# Patient Record
Sex: FEMALE | Race: ASIAN | NOT HISPANIC OR LATINO | URBAN - METROPOLITAN AREA
[De-identification: names, ages, dates, MRNs, and addresses within clinical notes are randomized per-mention and may not be internally consistent; named-entity substitution may affect disease eponyms.]

---

## 2024-01-20 ENCOUNTER — EMERGENCY (EMERGENCY)
Facility: HOSPITAL | Age: 39
LOS: 1 days | Discharge: HOME | End: 2024-01-20
Attending: EMERGENCY MEDICINE
Payer: MEDICAID

## 2024-01-20 VITALS
OXYGEN SATURATION: 98 % | DIASTOLIC BLOOD PRESSURE: 82 MMHG | WEIGHT: 95.24 LBS | HEART RATE: 82 BPM | RESPIRATION RATE: 16 BRPM | TEMPERATURE: 99 F | SYSTOLIC BLOOD PRESSURE: 121 MMHG

## 2024-01-20 PROCEDURE — 73080 X-RAY EXAM OF ELBOW: CPT

## 2024-01-20 PROCEDURE — 73130 X-RAY EXAM OF HAND: CPT | Mod: 26,LT

## 2024-01-20 PROCEDURE — 73110 X-RAY EXAM OF WRIST: CPT | Mod: 26,LT

## 2024-01-20 PROCEDURE — 29125 APPL SHORT ARM SPLINT STATIC: CPT | Mod: LT

## 2024-01-20 PROCEDURE — 99284 EMERGENCY DEPT VISIT MOD MDM: CPT | Mod: 57

## 2024-01-20 PROCEDURE — 73110 X-RAY EXAM OF WRIST: CPT

## 2024-01-20 PROCEDURE — 99284 EMERGENCY DEPT VISIT MOD MDM: CPT | Mod: 25

## 2024-01-20 PROCEDURE — 73130 X-RAY EXAM OF HAND: CPT

## 2024-01-20 PROCEDURE — 73060 X-RAY EXAM OF HUMERUS: CPT

## 2024-01-20 PROCEDURE — 73090 X-RAY EXAM OF FOREARM: CPT

## 2024-01-20 PROCEDURE — 73090 X-RAY EXAM OF FOREARM: CPT | Mod: 26,LT

## 2024-01-20 PROCEDURE — 73080 X-RAY EXAM OF ELBOW: CPT | Mod: 26,LT

## 2024-01-20 PROCEDURE — 73060 X-RAY EXAM OF HUMERUS: CPT | Mod: 26,LT

## 2024-01-20 PROCEDURE — 25600 CLTX DST RDL FX/EPHYS SEP WO: CPT | Mod: 54,LT

## 2024-01-20 RX ORDER — ACETAMINOPHEN 500 MG
975 TABLET ORAL ONCE
Refills: 0 | Status: COMPLETED | OUTPATIENT
Start: 2024-01-20 | End: 2024-01-20

## 2024-01-20 RX ORDER — OXYCODONE HYDROCHLORIDE 5 MG/1
5 TABLET ORAL ONCE
Refills: 0 | Status: DISCONTINUED | OUTPATIENT
Start: 2024-01-20 | End: 2024-01-20

## 2024-01-20 RX ORDER — ACETAMINOPHEN 500 MG
2 TABLET ORAL
Qty: 84 | Refills: 0
Start: 2024-01-20 | End: 2024-02-02

## 2024-01-20 RX ORDER — IBUPROFEN 200 MG
400 TABLET ORAL ONCE
Refills: 0 | Status: COMPLETED | OUTPATIENT
Start: 2024-01-20 | End: 2024-01-20

## 2024-01-20 RX ORDER — OXYCODONE HYDROCHLORIDE 5 MG/1
1 TABLET ORAL
Qty: 12 | Refills: 0
Start: 2024-01-20 | End: 2024-01-23

## 2024-01-20 RX ORDER — IBUPROFEN 200 MG
1 TABLET ORAL
Qty: 56 | Refills: 0
Start: 2024-01-20 | End: 2024-02-02

## 2024-01-20 RX ADMIN — OXYCODONE HYDROCHLORIDE 5 MILLIGRAM(S): 5 TABLET ORAL at 21:39

## 2024-01-20 RX ADMIN — Medication 400 MILLIGRAM(S): at 21:39

## 2024-01-20 RX ADMIN — OXYCODONE HYDROCHLORIDE 5 MILLIGRAM(S): 5 TABLET ORAL at 22:09

## 2024-01-20 RX ADMIN — Medication 975 MILLIGRAM(S): at 22:09

## 2024-01-20 RX ADMIN — Medication 975 MILLIGRAM(S): at 21:39

## 2024-01-20 RX ADMIN — Medication 400 MILLIGRAM(S): at 22:09

## 2024-01-20 NOTE — ED PROVIDER NOTE - ATTENDING CONTRIBUTION TO CARE
I performed the initial face to face bedside interview with this patient regarding history of present illness, review of symptoms and past medical, social and family history.  I completed an independent physical examination.  I was the initial provider who evaluated this patient.  The history, review of symptoms and examination was documented by the resident in my presence and I attest to the accuracy of the documentation.   I have discussed the patient’s plan of care and disposition with the resident.    +fx on xray. splinted. will dc with ortho f/u.

## 2024-01-20 NOTE — ED PROCEDURE NOTE - ATTENDING CONTRIBUTION TO CARE
I performed the initial face to face bedside interview with this patient regarding history of present illness, review of symptoms and past medical, social and family history.  I completed an independent physical examination.  I was the initial provider who evaluated this patient.  The history, review of symptoms and examination was documented by the resident in my presence and I attest to the accuracy of the documentation.   I have discussed the patient’s plan of care and disposition with the resident.

## 2024-01-20 NOTE — ED PROVIDER NOTE - CLINICAL SUMMARY MEDICAL DECISION MAKING FREE TEXT BOX
38-year-old female, no medical history, presenting with 1 day onset of left wrist pain, left forearm pain, left elbow pain, after a mechanical fall.  Patient is primarily Uzbek speaking.  Interpretation service offered.  Patient declined, preferred to have her friend Mr. Colt Garcia translates Uzbek language.  Patient reports that she slipped and fell at home and fell on the left side.  Denies head injury, LOC, any other injury.  Reports pain is 7 out of 10.  Did not take any medicine before coming in. Vital signs within normal limits on arrival.  Physical exam as noted above.  Notable for L wrist is swollen, L radial pulse intact, neurovascular intact, limited ROM of L wrist, L finger, L elbow due to pain, ROM of L shoulder normal.  Will get x-ray of her left humerus, elbow, forearm, wrist, hand to evaluate for fracture.  Will manage pain, reassess.

## 2024-01-20 NOTE — ED PROVIDER NOTE - OBJECTIVE STATEMENT
38-year-old female, no medical history, presenting with 1 day onset of left wrist pain, left forearm pain, left elbow pain, after a mechanical fall.  Patient is primarily Divehi speaking.  Interpretation service offered.  Patient declined, preferred to have her friend Mr. Colt Garcia translates Divehi language.  Patient reports that she slipped and fell at home and fell on the left side.  Denies head injury, LOC, any other injury.  Reports pain is 7 out of 10.  Did not take any medicine before coming in.

## 2024-01-20 NOTE — ED PROVIDER NOTE - PATIENT PORTAL LINK FT
You can access the FollowMyHealth Patient Portal offered by [facility] by registering at the following website: [email or web address]. By joining [facility]’s FollowMyHealth portal, you will also be able to view your health information using other applications (apps) compatible with our system.

## 2024-01-20 NOTE — ED ADULT NURSE NOTE - NSFALLRISKINTERV_ED_ALL_ED

## 2024-01-20 NOTE — ED PROVIDER NOTE - PHYSICAL EXAMINATION
Gen: Patient is NAD, AAOx3, able to ambulate without assistance  HEENT: NCAT, EOMI, PERRLA, normal conjunctiva, tongue midline, oral mucosa moist  Lung: CTAB, no respiratory distress, no wheezes/rhonchi/rales B/L, speaking in full sentences  CV: RRR, no murmurs, rubs or gallops, distal pulses 2+ b/l  Abd: soft, NT, ND, no guarding, no rigidity, no rebound tenderness, no CVA tenderness   MSK: L wrist is swollen, L radial pulse intact, neurovascular intact, limited ROM of L wrist, L finger, L elbow due to pain, ROM of L shoulder normal, ROM normal in RUE/RLE/LLE, no back TTP  Neuro: No focal sensory or motor deficits  Skin: Warm, well perfused, no rash, no leg swelling  Psych: normal affect, calm

## 2024-01-20 NOTE — ED PROVIDER NOTE - NSFOLLOWUPINSTRUCTIONS_ED_ALL_ED_FT
You came to the emergency room because of left wrist pain after fall.      Your x-ray shows that you have a fracture of your distal radius.      For severe pain,   Please take the prescribed medication oxycodone 5 mg every 8 hours as needed for severe pain.    For mild-moderate pain,  You may take 1000 mg acetaminophen every 8 hours, as needed for pain  You may take 400 mg ibuprofen every 6 hours, with food, as needed for pain    You were splinted at the emergency room.      Please follow-up with the orthopedic surgeon in the clinic within the next 7 days for further evaluation and monitoring.      A representative from [facility] will contact you within the next few days to schedule for appointment.      Please come back to the emergency room if your symptoms get worse.

## 2024-01-20 NOTE — ED PROVIDER NOTE - NSICDXNOPASTSURGICALHX_GEN_ALL_ED
Pt and mom left prior to receiving discharge instructions. Walter Chancema aware.
<-- Click to add NO significant Past Surgical History

## 2024-01-22 PROBLEM — Z00.00 ENCOUNTER FOR PREVENTIVE HEALTH EXAMINATION: Status: ACTIVE | Noted: 2024-01-22

## 2024-01-23 ENCOUNTER — APPOINTMENT (OUTPATIENT)
Dept: ORTHOPEDIC SURGERY | Facility: CLINIC | Age: 39
End: 2024-01-23
Payer: MEDICAID

## 2024-01-23 PROCEDURE — 73110 X-RAY EXAM OF WRIST: CPT | Mod: LT

## 2024-01-23 PROCEDURE — 99204 OFFICE O/P NEW MOD 45 MIN: CPT

## 2024-01-23 RX ORDER — IBUPROFEN 800 MG/1
800 TABLET, FILM COATED ORAL 3 TIMES DAILY
Qty: 90 | Refills: 11 | Status: ACTIVE | COMMUNITY
Start: 2024-01-23 | End: 1900-01-01

## 2024-01-24 ENCOUNTER — OUTPATIENT (OUTPATIENT)
Dept: OUTPATIENT SERVICES | Facility: HOSPITAL | Age: 39
LOS: 1 days | End: 2024-01-24
Payer: MEDICAID

## 2024-01-24 VITALS
RESPIRATION RATE: 14 BRPM | WEIGHT: 110.01 LBS | HEIGHT: 64.5 IN | HEART RATE: 73 BPM | OXYGEN SATURATION: 100 % | TEMPERATURE: 98 F | DIASTOLIC BLOOD PRESSURE: 74 MMHG | SYSTOLIC BLOOD PRESSURE: 114 MMHG

## 2024-01-24 DIAGNOSIS — D21.9 BENIGN NEOPLASM OF CONNECTIVE AND OTHER SOFT TISSUE, UNSPECIFIED: Chronic | ICD-10-CM

## 2024-01-24 DIAGNOSIS — S52.572D OTHER INTRAARTICULAR FRACTURE OF LOWER END OF LEFT RADIUS, SUBSEQUENT ENCOUNTER FOR CLOSED FRACTURE WITH ROUTINE HEALING: ICD-10-CM

## 2024-01-24 DIAGNOSIS — Z01.818 ENCOUNTER FOR OTHER PREPROCEDURAL EXAMINATION: ICD-10-CM

## 2024-01-24 DIAGNOSIS — S52.572A OTHER INTRAARTICULAR FRACTURE OF LOWER END OF LEFT RADIUS, INITIAL ENCOUNTER FOR CLOSED FRACTURE: ICD-10-CM

## 2024-01-24 LAB
HCT VFR BLD CALC: 34.8 % — SIGNIFICANT CHANGE UP (ref 34.5–45)
HGB BLD-MCNC: 11.1 G/DL — LOW (ref 11.5–15.5)
MCHC RBC-ENTMCNC: 28.5 PG — SIGNIFICANT CHANGE UP (ref 27–34)
MCHC RBC-ENTMCNC: 31.9 GM/DL — LOW (ref 32–36)
MCV RBC AUTO: 89.2 FL — SIGNIFICANT CHANGE UP (ref 80–100)
NRBC # BLD: 0 /100 WBCS — SIGNIFICANT CHANGE UP (ref 0–0)
PLATELET # BLD AUTO: 160 K/UL — SIGNIFICANT CHANGE UP (ref 150–400)
RBC # BLD: 3.9 M/UL — SIGNIFICANT CHANGE UP (ref 3.8–5.2)
RBC # FLD: 14 % — SIGNIFICANT CHANGE UP (ref 10.3–14.5)
WBC # BLD: 6.84 K/UL — SIGNIFICANT CHANGE UP (ref 3.8–10.5)
WBC # FLD AUTO: 6.84 K/UL — SIGNIFICANT CHANGE UP (ref 3.8–10.5)

## 2024-01-24 PROCEDURE — 85027 COMPLETE CBC AUTOMATED: CPT

## 2024-01-24 PROCEDURE — G0463: CPT

## 2024-01-24 RX ORDER — SODIUM CHLORIDE 9 MG/ML
3 INJECTION INTRAMUSCULAR; INTRAVENOUS; SUBCUTANEOUS EVERY 8 HOURS
Refills: 0 | Status: DISCONTINUED | OUTPATIENT
Start: 2024-01-26 | End: 2024-02-09

## 2024-01-24 RX ORDER — SODIUM CHLORIDE 9 MG/ML
1000 INJECTION, SOLUTION INTRAVENOUS
Refills: 0 | Status: DISCONTINUED | OUTPATIENT
Start: 2024-01-26 | End: 2024-02-09

## 2024-01-24 NOTE — H&P PST ADULT - PROBLEM SELECTOR PLAN 1
Pt scheduled for open reduction internal fixation, left distal radius with Dr. Keiry Ronquillo 1/26/2024.  -Pre op instructions provided.  LABS: CBC done at [facility].

## 2024-01-24 NOTE — H&P PST ADULT - ASSESSMENT
DASI score: 7.68  DASI activity: Able to walk long distance, incline/stairs, shopping, carry groceries, house chores, active, self care without SOB, CP.  Loose teeth or denture: denies any loose teeth, implants, dentures

## 2024-01-24 NOTE — H&P PST ADULT - HISTORY OF PRESENT ILLNESS
39 y/o F, German speaking, PMHx (denies any medical issues), sustained a fall at home and landed on left forearm, had a lot of pain and went to the ED 1/20/2024.  Left forearm in splint, pain scale 8/10 achy, throbbing.  PT taking Ibuprofen and Oxycodone until yesterday.  PT presents for PST for open reduction internal fixation, left distal radius with Dr. Keiry Ronquillo 1/26/2024.  PT denies any fever, chills, N/V/D, SOB, CP, dizziness, HA, urinary or BM issues.    "***OF note, pt stated she wants to be under anesthesia without waking up in the middle of procedure as it has happened when she had fibroid removed.

## 2024-01-25 ENCOUNTER — TRANSCRIPTION ENCOUNTER (OUTPATIENT)
Age: 39
End: 2024-01-25

## 2024-01-26 ENCOUNTER — APPOINTMENT (OUTPATIENT)
Dept: ORTHOPEDIC SURGERY | Facility: HOSPITAL | Age: 39
End: 2024-01-26

## 2024-01-26 ENCOUNTER — TRANSCRIPTION ENCOUNTER (OUTPATIENT)
Age: 39
End: 2024-01-26

## 2024-01-26 ENCOUNTER — OUTPATIENT (OUTPATIENT)
Dept: OUTPATIENT SERVICES | Facility: HOSPITAL | Age: 39
LOS: 1 days | End: 2024-01-26
Payer: MEDICAID

## 2024-01-26 VITALS
HEART RATE: 70 BPM | SYSTOLIC BLOOD PRESSURE: 93 MMHG | OXYGEN SATURATION: 100 % | HEIGHT: 64.5 IN | DIASTOLIC BLOOD PRESSURE: 58 MMHG | TEMPERATURE: 99 F | RESPIRATION RATE: 16 BRPM | WEIGHT: 110.01 LBS

## 2024-01-26 VITALS
OXYGEN SATURATION: 99 % | DIASTOLIC BLOOD PRESSURE: 69 MMHG | RESPIRATION RATE: 18 BRPM | HEART RATE: 72 BPM | SYSTOLIC BLOOD PRESSURE: 111 MMHG

## 2024-01-26 DIAGNOSIS — S52.572D OTHER INTRAARTICULAR FRACTURE OF LOWER END OF LEFT RADIUS, SUBSEQUENT ENCOUNTER FOR CLOSED FRACTURE WITH ROUTINE HEALING: ICD-10-CM

## 2024-01-26 DIAGNOSIS — D21.9 BENIGN NEOPLASM OF CONNECTIVE AND OTHER SOFT TISSUE, UNSPECIFIED: Chronic | ICD-10-CM

## 2024-01-26 PROCEDURE — 25608 OPTX DST RD XART FX/EPI SEP2: CPT | Mod: LT

## 2024-01-26 PROCEDURE — 25609 OPTX DST RD XART FX/EP SEP3+: CPT | Mod: LT

## 2024-01-26 PROCEDURE — 76000 FLUOROSCOPY <1 HR PHYS/QHP: CPT

## 2024-01-26 PROCEDURE — C1713: CPT

## 2024-01-26 DEVICE — SCREW TRILOCK 2.5X18MM: Type: IMPLANTABLE DEVICE | Site: LEFT | Status: FUNCTIONAL

## 2024-01-26 DEVICE — SCREW CORT 2.5X12MM: Type: IMPLANTABLE DEVICE | Site: LEFT | Status: FUNCTIONAL

## 2024-01-26 DEVICE — PLATE NARROW 10H LEFT: Type: IMPLANTABLE DEVICE | Site: LEFT | Status: FUNCTIONAL

## 2024-01-26 RX ORDER — OXYCODONE 5 MG/1
5 TABLET ORAL
Qty: 10 | Refills: 0 | Status: ACTIVE | COMMUNITY
Start: 2024-01-26 | End: 1900-01-01

## 2024-01-26 RX ORDER — FENTANYL CITRATE 50 UG/ML
25 INJECTION INTRAVENOUS
Refills: 0 | Status: DISCONTINUED | OUTPATIENT
Start: 2024-01-26 | End: 2024-01-26

## 2024-01-26 RX ORDER — ONDANSETRON 8 MG/1
4 TABLET, FILM COATED ORAL ONCE
Refills: 0 | Status: DISCONTINUED | OUTPATIENT
Start: 2024-01-26 | End: 2024-02-09

## 2024-01-26 RX ORDER — LIDOCAINE HCL 20 MG/ML
0.2 VIAL (ML) INJECTION ONCE
Refills: 0 | Status: COMPLETED | OUTPATIENT
Start: 2024-01-26 | End: 2024-01-26

## 2024-01-26 RX ORDER — CEFAZOLIN SODIUM 1 G
2000 VIAL (EA) INJECTION ONCE
Refills: 0 | Status: COMPLETED | OUTPATIENT
Start: 2024-01-26 | End: 2024-01-26

## 2024-01-26 RX ADMIN — SODIUM CHLORIDE 100 MILLILITER(S): 9 INJECTION, SOLUTION INTRAVENOUS at 06:32

## 2024-01-26 RX ADMIN — SODIUM CHLORIDE 3 MILLILITER(S): 9 INJECTION INTRAMUSCULAR; INTRAVENOUS; SUBCUTANEOUS at 06:39

## 2024-01-26 NOTE — ASU PATIENT PROFILE, ADULT - FALL HARM RISK - HARM RISK INTERVENTIONS

## 2024-01-26 NOTE — BRIEF OPERATIVE NOTE - NSICDXBRIEFPROCEDURE_GEN_ALL_CORE_FT
PROCEDURES:  ORIF, 2-part fracture, radius, distal, intra-articular 26-Jan-2024 09:37:27  Denton Buckner

## 2024-01-26 NOTE — ASU DISCHARGE PLAN (ADULT/PEDIATRIC) - NURSING INSTRUCTIONS
OK to take Tylenol/Acetaminophen 1000mg  anytime after discharge for pain and every 6 hours after as needed. OK to take Motrin/Ibuprofen alternating with Tylenol for every 6 hours  as needed.

## 2024-01-30 RX ORDER — METHYLPREDNISOLONE 4 MG/1
4 TABLET ORAL
Qty: 1 | Refills: 1 | Status: ACTIVE | COMMUNITY
Start: 2024-01-30 | End: 1900-01-01

## 2024-01-30 RX ORDER — HYDROCORTISONE 25 MG/G
2.5 CREAM TOPICAL 3 TIMES DAILY
Qty: 1 | Refills: 0 | Status: ACTIVE | COMMUNITY
Start: 2024-01-30 | End: 1900-01-01

## 2024-02-05 ENCOUNTER — APPOINTMENT (OUTPATIENT)
Dept: ORTHOPEDIC SURGERY | Facility: CLINIC | Age: 39
End: 2024-02-05
Payer: MEDICAID

## 2024-02-05 PROBLEM — S52.90XA UNSPECIFIED FRACTURE OF UNSPECIFIED FOREARM, INITIAL ENCOUNTER FOR CLOSED FRACTURE: Chronic | Status: ACTIVE | Noted: 2024-01-24

## 2024-02-05 PROCEDURE — 99024 POSTOP FOLLOW-UP VISIT: CPT

## 2024-02-05 PROCEDURE — 73110 X-RAY EXAM OF WRIST: CPT | Mod: LT

## 2024-02-05 RX ORDER — MELOXICAM 15 MG/1
15 TABLET ORAL
Qty: 30 | Refills: 11 | Status: ACTIVE | COMMUNITY
Start: 2024-02-05 | End: 1900-01-01

## 2024-02-09 ENCOUNTER — APPOINTMENT (OUTPATIENT)
Dept: ORTHOPEDIC SURGERY | Facility: CLINIC | Age: 39
End: 2024-02-09

## 2024-03-05 ENCOUNTER — APPOINTMENT (OUTPATIENT)
Dept: ORTHOPEDIC SURGERY | Facility: CLINIC | Age: 39
End: 2024-03-05
Payer: MEDICAID

## 2024-03-05 PROCEDURE — 73110 X-RAY EXAM OF WRIST: CPT | Mod: LT

## 2024-03-05 PROCEDURE — 99024 POSTOP FOLLOW-UP VISIT: CPT

## 2024-04-02 ENCOUNTER — APPOINTMENT (OUTPATIENT)
Dept: ORTHOPEDIC SURGERY | Facility: CLINIC | Age: 39
End: 2024-04-02
Payer: MEDICAID

## 2024-04-02 DIAGNOSIS — S52.572D OTHER INTRAARTICULAR FRACTURE OF LOWER END OF LEFT RADIUS, SUBSEQUENT ENCOUNTER FOR CLOSED FRACTURE WITH ROUTINE HEALING: ICD-10-CM

## 2024-04-02 PROCEDURE — 73110 X-RAY EXAM OF WRIST: CPT | Mod: LT

## 2024-04-02 PROCEDURE — 99024 POSTOP FOLLOW-UP VISIT: CPT

## 2025-04-03 ENCOUNTER — APPOINTMENT (OUTPATIENT)
Dept: ORTHOPEDIC SURGERY | Facility: CLINIC | Age: 40
End: 2025-04-03

## (undated) DEVICE — SPECIMEN CONTAINER 100ML

## (undated) DEVICE — DRSG WEBRIL 4"

## (undated) DEVICE — BLADE SURGICAL #15 CARBON

## (undated) DEVICE — DRILL BIT MEDARTIS TWIST 2X40X91MM

## (undated) DEVICE — PACK HAND

## (undated) DEVICE — PREP CHLORAPREP HI-LITE ORANGE 26ML

## (undated) DEVICE — DRSG KLING 3"

## (undated) DEVICE — TOURNIQUET CUFF 18" DUAL PORT DUAL BLADDER W PLC (BLACK)

## (undated) DEVICE — POSITIONER FOAM EGG CRATE ULNAR 2PCS (PINK)

## (undated) DEVICE — DRSG ACE BANDAGE 4" NS

## (undated) DEVICE — WARMING BLANKET LOWER ADULT

## (undated) DEVICE — VENODYNE/SCD SLEEVE CALF LARGE

## (undated) DEVICE — DRSG CAST PLASTER XFAST 3"

## (undated) DEVICE — SUT MONOCRYL 4-0 18" P-3

## (undated) DEVICE — SOL IRR POUR H2O 250ML

## (undated) DEVICE — GLV 7 PROTEXIS (WHITE)

## (undated) DEVICE — DRSG COBAN 4"

## (undated) DEVICE — SUT POLYSORB 3-0 18" P-12 UNDYED

## (undated) DEVICE — SOL IRR POUR NS 0.9% 500ML

## (undated) DEVICE — DRAPE C ARM MINI PACK FOR 6800

## (undated) DEVICE — DRSG CAST PLASTER 3" (BLUE)

## (undated) DEVICE — TOURNIQUET CUFF 24" DUAL PORT DUAL BLADDER W PLC (BLACK)